# Patient Record
Sex: FEMALE | Race: WHITE
[De-identification: names, ages, dates, MRNs, and addresses within clinical notes are randomized per-mention and may not be internally consistent; named-entity substitution may affect disease eponyms.]

---

## 2022-08-01 ENCOUNTER — HOSPITAL ENCOUNTER (OUTPATIENT)
Dept: HOSPITAL 19 - LDRO | Age: 32
Discharge: HOME | End: 2022-08-01
Attending: OBSTETRICS & GYNECOLOGY
Payer: OTHER GOVERNMENT

## 2022-08-01 VITALS — HEART RATE: 81 BPM | DIASTOLIC BLOOD PRESSURE: 79 MMHG | SYSTOLIC BLOOD PRESSURE: 119 MMHG

## 2022-08-01 VITALS — BODY MASS INDEX: 30.17 KG/M2 | HEIGHT: 69.02 IN | WEIGHT: 203.71 LBS

## 2022-08-01 VITALS — DIASTOLIC BLOOD PRESSURE: 76 MMHG | SYSTOLIC BLOOD PRESSURE: 113 MMHG | HEART RATE: 77 BPM

## 2022-08-01 VITALS — SYSTOLIC BLOOD PRESSURE: 144 MMHG | HEART RATE: 85 BPM | TEMPERATURE: 98.3 F | DIASTOLIC BLOOD PRESSURE: 93 MMHG

## 2022-08-01 VITALS — SYSTOLIC BLOOD PRESSURE: 118 MMHG | DIASTOLIC BLOOD PRESSURE: 77 MMHG | HEART RATE: 77 BPM

## 2022-08-01 VITALS — DIASTOLIC BLOOD PRESSURE: 80 MMHG | HEART RATE: 91 BPM | SYSTOLIC BLOOD PRESSURE: 128 MMHG

## 2022-08-01 VITALS — SYSTOLIC BLOOD PRESSURE: 122 MMHG | HEART RATE: 74 BPM | DIASTOLIC BLOOD PRESSURE: 77 MMHG

## 2022-08-01 DIAGNOSIS — Z3A.38: ICD-10-CM

## 2022-08-01 DIAGNOSIS — O16.3: Primary | ICD-10-CM

## 2022-08-01 NOTE — NUR
1255 PT AMBULATORY TO UNIT WITH SUPPORT PERSON. PT COMFORTABLE TODAY. REPORTS
NO CTX, NO LEAKING OF FLUID, AND POSITIVE FETAL MOVEMENT. SENT FROM OFFICE
FOR SERIAL BLOOD PRESSURES. REPORTS SHE GOT LAB DRAWN AND COMPLETED 24HR URINE
SPECIMEN THAT WAS TURNED IN TO [facility]. EFM TRACING CATEGORY 1 AT
THIS TIME.

## 2022-08-23 ENCOUNTER — HOSPITAL ENCOUNTER (INPATIENT)
Dept: HOSPITAL 19 - LDRO | Age: 32
LOS: 2 days | Discharge: HOME | End: 2022-08-25
Attending: OBSTETRICS & GYNECOLOGY | Admitting: OBSTETRICS & GYNECOLOGY
Payer: OTHER GOVERNMENT

## 2022-08-23 VITALS — SYSTOLIC BLOOD PRESSURE: 136 MMHG | HEART RATE: 80 BPM | DIASTOLIC BLOOD PRESSURE: 85 MMHG

## 2022-08-23 VITALS — SYSTOLIC BLOOD PRESSURE: 128 MMHG | HEART RATE: 78 BPM | DIASTOLIC BLOOD PRESSURE: 80 MMHG

## 2022-08-23 VITALS — HEART RATE: 75 BPM | DIASTOLIC BLOOD PRESSURE: 79 MMHG | SYSTOLIC BLOOD PRESSURE: 138 MMHG

## 2022-08-23 VITALS — HEART RATE: 76 BPM | DIASTOLIC BLOOD PRESSURE: 74 MMHG | SYSTOLIC BLOOD PRESSURE: 129 MMHG

## 2022-08-23 VITALS — HEART RATE: 75 BPM | SYSTOLIC BLOOD PRESSURE: 129 MMHG | DIASTOLIC BLOOD PRESSURE: 68 MMHG

## 2022-08-23 VITALS — DIASTOLIC BLOOD PRESSURE: 76 MMHG | SYSTOLIC BLOOD PRESSURE: 119 MMHG | HEART RATE: 81 BPM

## 2022-08-23 VITALS — HEART RATE: 74 BPM | SYSTOLIC BLOOD PRESSURE: 138 MMHG | DIASTOLIC BLOOD PRESSURE: 83 MMHG

## 2022-08-23 VITALS — DIASTOLIC BLOOD PRESSURE: 83 MMHG | HEART RATE: 72 BPM | SYSTOLIC BLOOD PRESSURE: 133 MMHG

## 2022-08-23 VITALS — BODY MASS INDEX: 31.18 KG/M2 | HEIGHT: 69.02 IN | WEIGHT: 210.54 LBS

## 2022-08-23 VITALS — HEART RATE: 77 BPM | DIASTOLIC BLOOD PRESSURE: 73 MMHG | SYSTOLIC BLOOD PRESSURE: 125 MMHG

## 2022-08-23 VITALS — DIASTOLIC BLOOD PRESSURE: 83 MMHG | HEART RATE: 77 BPM | SYSTOLIC BLOOD PRESSURE: 129 MMHG

## 2022-08-23 VITALS — SYSTOLIC BLOOD PRESSURE: 129 MMHG | DIASTOLIC BLOOD PRESSURE: 83 MMHG | HEART RATE: 77 BPM

## 2022-08-23 VITALS — SYSTOLIC BLOOD PRESSURE: 129 MMHG | DIASTOLIC BLOOD PRESSURE: 70 MMHG | HEART RATE: 85 BPM

## 2022-08-23 VITALS — DIASTOLIC BLOOD PRESSURE: 71 MMHG | HEART RATE: 92 BPM | SYSTOLIC BLOOD PRESSURE: 131 MMHG

## 2022-08-23 VITALS — SYSTOLIC BLOOD PRESSURE: 130 MMHG | HEART RATE: 74 BPM | DIASTOLIC BLOOD PRESSURE: 72 MMHG

## 2022-08-23 VITALS — DIASTOLIC BLOOD PRESSURE: 75 MMHG | HEART RATE: 89 BPM | SYSTOLIC BLOOD PRESSURE: 145 MMHG

## 2022-08-23 VITALS — TEMPERATURE: 97.9 F | HEART RATE: 71 BPM | DIASTOLIC BLOOD PRESSURE: 79 MMHG | SYSTOLIC BLOOD PRESSURE: 133 MMHG

## 2022-08-23 VITALS — DIASTOLIC BLOOD PRESSURE: 74 MMHG | SYSTOLIC BLOOD PRESSURE: 136 MMHG | HEART RATE: 74 BPM

## 2022-08-23 VITALS — TEMPERATURE: 97.8 F | HEART RATE: 84 BPM

## 2022-08-23 VITALS — DIASTOLIC BLOOD PRESSURE: 71 MMHG | SYSTOLIC BLOOD PRESSURE: 130 MMHG | HEART RATE: 65 BPM

## 2022-08-23 VITALS — HEART RATE: 78 BPM | SYSTOLIC BLOOD PRESSURE: 138 MMHG | DIASTOLIC BLOOD PRESSURE: 97 MMHG

## 2022-08-23 DIAGNOSIS — O48.0: Primary | ICD-10-CM

## 2022-08-23 DIAGNOSIS — Z3A.41: ICD-10-CM

## 2022-08-23 LAB
ALBUMIN SERPL-MCNC: 3.1 GM/DL (ref 3.5–5)
ALP SERPL-CCNC: 212 U/L (ref 40–150)
ALT SERPL-CCNC: 20 U/L (ref 0–55)
ANION GAP SERPL CALC-SCNC: 11 MMOL/L (ref 7–16)
AST SERPL-CCNC: 19 U/L (ref 5–34)
BASOPHILS # BLD: 0 K/MM3 (ref 0–0.2)
BASOPHILS NFR BLD AUTO: 0.2 % (ref 0–2)
BILIRUB SERPL-MCNC: 0.4 MG/DL (ref 0.2–1.2)
BUN SERPL-MCNC: 7 MG/DL (ref 7–19)
CALCIUM SERPL-MCNC: 8.9 MG/DL (ref 8.4–10.2)
CHLORIDE SERPL-SCNC: 107 MMOL/L (ref 98–107)
CO2 SERPL-SCNC: 19 MMOL/L (ref 22–29)
CREAT SERPL-SCNC: 0.61 MG/DL (ref 0.57–1.11)
EOSINOPHIL # BLD: 0 K/MM3 (ref 0–0.7)
EOSINOPHIL NFR BLD: 0.4 % (ref 0–4)
ERYTHROCYTE [DISTWIDTH] IN BLOOD BY AUTOMATED COUNT: 14.1 % (ref 11.5–14.5)
GLUCOSE SERPL-MCNC: 84 MG/DL (ref 70–99)
GRANULOCYTES # BLD AUTO: 74.8 % (ref 42.2–75.2)
HCT VFR BLD AUTO: 37 % (ref 37–47)
HGB BLD-MCNC: 12.4 G/DL (ref 12.5–16)
KETONES UR STRIP.AUTO-MCNC: (no result) MG/DL
LYMPHOCYTES # BLD: 1.4 K/MM3 (ref 1.2–3.4)
LYMPHOCYTES NFR BLD: 14.9 % (ref 20–51)
MCH RBC QN AUTO: 29 PG (ref 27–31)
MCHC RBC AUTO-ENTMCNC: 34 G/DL (ref 33–37)
MCV RBC AUTO: 87 FL (ref 80–100)
MONOCYTES # BLD: 0.8 K/MM3 (ref 0.1–0.6)
MONOCYTES NFR BLD AUTO: 9.1 % (ref 1.7–9.3)
NEUTROPHILS # BLD: 6.8 K/MM3 (ref 1.4–6.5)
PH UR STRIP.AUTO: 6 [PH] (ref 5–8.5)
PLATELET # BLD AUTO: 252 K/MM3 (ref 130–400)
PMV BLD AUTO: 10.4 FL (ref 7.4–10.4)
POTASSIUM SERPL-SCNC: 3.6 MMOL/L (ref 3.5–4.5)
PROT SERPL-MCNC: 6.5 GM/DL (ref 6.2–8.1)
RBC # BLD AUTO: 4.27 M/MM3 (ref 4.1–5.3)
RBC # UR: (no result) /HPF (ref 0–2)
SODIUM SERPL-SCNC: 137 MMOL/L (ref 136–145)
SP GR UR STRIP.AUTO: 1.02 (ref 1–1.03)
SQUAMOUS # URNS: (no result) /HPF (ref 0–10)
URN COLLECT METHOD CLASS: (no result)
UROBILINOGEN UR STRIP.AUTO-MCNC: 0.2 E.U/DL (ref 0.2–1)
WBC # UR: (no result) /HPF (ref 0–2)

## 2022-08-23 PROCEDURE — 0UQMXZZ REPAIR VULVA, EXTERNAL APPROACH: ICD-10-PCS | Performed by: OBSTETRICS & GYNECOLOGY

## 2022-08-23 NOTE — NUR
1630 - PATIENT AMBULATORY TO [facility] [place] ACCOMPANIED BY SPOUSE. PLAN OF CARE
REVIEWED. PATIENT CHANGES INTO GOWN. PATIENT REPORTS CONTRACTIONS STARTING
TODAY AT 0030 AND CONTINUING THROUGHOUT THE DAY. PATIENT ALSO REPORTS
A GUSH OF FLUID AT 1500. PATIENT DENIES BLOODY SHOW AND STATES BABY HAS GOOD
FETAL MOVEMENT.
 
1636 - PATIENT ON MONITOR.
 
1639 - SVE PERFORMED BY DELFINO PUGA. 4/80/-1.
 
1645 - IV PLACED LEFT HAND. LABS DRAWN AS ORDERED.
 
1705 - PATIENT OFF MONITORING FOR AMBULATION IN HALLWAY.
 
1735 - PATIENT RETURNS TO ROOM. PATIENT REQUESTS EPIDURAL - SD MILLER
NOTIFIED OF PATIENT REQUEST. LR BOLUS INITIATED.
 
1750 - SD MILLER AT BEDSIDE FOR EPIDURAL PLACEMENT.
 
1800 - EFM TRACING INDESCERNIBLE DUE TO MATERNAL POSITION.
 
1806 - TEST DOSE GIVEN BY CRNA. PATIENT TOLERATED WELL. PATIENT REPOSITIONED.
EFM REPOSITIONED. CARE ONGOING.

## 2022-08-23 NOTE — NUR
Landry catheter placed to dependent drainage. Clear yellow urine out. Secured
to leg with statlock. SVE 7/90/0. Pt positioned to left lateral with peanut
ball. Safety precautions reviewed.

## 2022-08-23 NOTE — NUR
Dr. Kovacs at bedside for SVE. 7-8/90/0. Discussing with patient augmenting
with pitocin, pt agreeable at this time. Pt positioned to right lateral with
peanut ball.
2000 - Pitocin initiated at 2 mu/min per Dr. Kovacs.

## 2022-08-23 NOTE — NUR
2038 - Dr. Kovacs at bedside. SVE complete/+2. Room set up for delivery.
Nursery nurse called to bedside. Landry catheter out. 150 of yellow urine out.
Pt educated on pushing techniques, verbalized understanding.
2045 - Dr. Kovacs remains at bedside. Initial push at this time. Pt pushing
well. DELFINO Louis at bedside for nursery.
2050 - Spontaneous delivery of viable infant girl. Infant placed to mothers
abdomen. Care of infant assumed to DELFINO Louis. Cord clamped x 2 by Dr. Kovacs and cut by FOB. Cord blood donation sample obtained by Dr. Kovacs.
Pitocin off. Cord blood obtained.
2056 - Spontaneous delivery of intact placenta. Fundus firm and down 1 from
umbilicus. Pitocin restarted at 333 mL/hr per protocol. Small amount of
bleeding noted. Vaginal laceration repaired by Dr. Kovacs. Epidural off.
2100 - Pericare provided. New chux beneath patient and ice pack to perineum.
Postpartum recovery started. See physician delivery note.

## 2022-08-24 VITALS — HEART RATE: 61 BPM | SYSTOLIC BLOOD PRESSURE: 131 MMHG | TEMPERATURE: 97.8 F | DIASTOLIC BLOOD PRESSURE: 80 MMHG

## 2022-08-24 VITALS — HEART RATE: 76 BPM | SYSTOLIC BLOOD PRESSURE: 104 MMHG | DIASTOLIC BLOOD PRESSURE: 57 MMHG

## 2022-08-24 VITALS — HEART RATE: 76 BPM | SYSTOLIC BLOOD PRESSURE: 116 MMHG | DIASTOLIC BLOOD PRESSURE: 66 MMHG

## 2022-08-24 VITALS — DIASTOLIC BLOOD PRESSURE: 69 MMHG | SYSTOLIC BLOOD PRESSURE: 126 MMHG | HEART RATE: 69 BPM | TEMPERATURE: 98.2 F

## 2022-08-24 VITALS — SYSTOLIC BLOOD PRESSURE: 126 MMHG | TEMPERATURE: 97.7 F | DIASTOLIC BLOOD PRESSURE: 75 MMHG | HEART RATE: 86 BPM

## 2022-08-24 NOTE — NUR
Pt positioned to sitting on edge of bed. Epidural catheter removed. Tip
smooth, blue, and intact. Pt ambulated to bathroom independently. Pericare
explained and provided. Pt able to void 900. Mesh panties and peripad applied.
Clean gown on. Pt educated on need of 3 measured voids. Pt ambulated to room
216 with belongings.

## 2022-08-24 NOTE — NUR
Straight cath at this time. 1000 mL clear yellow urine out. Fundus firm and at
umbilicus after bladder emptied and scant bleeding noted. Pericare provided.
Mesh panties and peripad provided. Pt encouraged to keep baby in nursery and
rest before attempting to ambulate.

## 2022-08-25 VITALS — DIASTOLIC BLOOD PRESSURE: 80 MMHG | HEART RATE: 65 BPM | TEMPERATURE: 97.9 F | SYSTOLIC BLOOD PRESSURE: 133 MMHG
